# Patient Record
Sex: FEMALE | Race: WHITE | Employment: FULL TIME | URBAN - METROPOLITAN AREA
[De-identification: names, ages, dates, MRNs, and addresses within clinical notes are randomized per-mention and may not be internally consistent; named-entity substitution may affect disease eponyms.]

---

## 2019-02-11 ENCOUNTER — HOSPITAL ENCOUNTER (EMERGENCY)
Age: 19
Discharge: HOME | End: 2019-02-11

## 2019-02-11 VITALS
SYSTOLIC BLOOD PRESSURE: 129 MMHG | BODY MASS INDEX: 33.32 KG/M2 | RESPIRATION RATE: 16 BRPM | TEMPERATURE: 98 F | DIASTOLIC BLOOD PRESSURE: 86 MMHG | OXYGEN SATURATION: 100 % | WEIGHT: 200 LBS | HEIGHT: 65 IN | HEART RATE: 90 BPM

## 2019-02-11 DIAGNOSIS — F41.1 ANXIETY STATE: ICD-10-CM

## 2019-02-11 DIAGNOSIS — J02.9 ACUTE PHARYNGITIS, UNSPECIFIED ETIOLOGY: ICD-10-CM

## 2019-02-11 DIAGNOSIS — R59.0 AXILLARY LYMPHADENOPATHY: Primary | ICD-10-CM

## 2019-02-11 PROCEDURE — 99282 EMERGENCY DEPT VISIT SF MDM: CPT

## 2019-02-11 RX ORDER — HYDROXYZINE PAMOATE 25 MG/1
25 CAPSULE ORAL 3 TIMES DAILY PRN
Qty: 30 CAPSULE | Refills: 0 | Status: SHIPPED | OUTPATIENT
Start: 2019-02-11 | End: 2019-02-25

## 2019-02-11 ASSESSMENT — PAIN SCALES - GENERAL: PAINLEVEL_OUTOF10: 8

## 2019-02-13 ENCOUNTER — HOSPITAL ENCOUNTER (EMERGENCY)
Age: 19
Discharge: HOME | End: 2019-02-13

## 2019-02-13 VITALS
RESPIRATION RATE: 16 BRPM | TEMPERATURE: 97.7 F | HEIGHT: 65 IN | HEART RATE: 75 BPM | SYSTOLIC BLOOD PRESSURE: 106 MMHG | DIASTOLIC BLOOD PRESSURE: 60 MMHG | BODY MASS INDEX: 33.32 KG/M2 | WEIGHT: 200 LBS | OXYGEN SATURATION: 100 %

## 2019-02-13 DIAGNOSIS — L02.419 AXILLARY ABSCESS: Primary | ICD-10-CM

## 2019-02-13 PROCEDURE — 2500000003 HC RX 250 WO HCPCS: Performed by: PHYSICIAN ASSISTANT

## 2019-02-13 PROCEDURE — 90715 TDAP VACCINE 7 YRS/> IM: CPT | Performed by: PHYSICIAN ASSISTANT

## 2019-02-13 PROCEDURE — 4500000027

## 2019-02-13 PROCEDURE — 6370000000 HC RX 637 (ALT 250 FOR IP): Performed by: PHYSICIAN ASSISTANT

## 2019-02-13 PROCEDURE — 6360000002 HC RX W HCPCS: Performed by: PHYSICIAN ASSISTANT

## 2019-02-13 PROCEDURE — 90471 IMMUNIZATION ADMIN: CPT | Performed by: PHYSICIAN ASSISTANT

## 2019-02-13 PROCEDURE — 99282 EMERGENCY DEPT VISIT SF MDM: CPT

## 2019-02-13 RX ORDER — CEPHALEXIN 500 MG/1
500 CAPSULE ORAL 4 TIMES DAILY
Qty: 40 CAPSULE | Refills: 0 | Status: SHIPPED | OUTPATIENT
Start: 2019-02-13 | End: 2019-02-23

## 2019-02-13 RX ORDER — SULFAMETHOXAZOLE AND TRIMETHOPRIM 800; 160 MG/1; MG/1
TABLET ORAL
Qty: 40 TABLET | Refills: 0 | Status: SHIPPED | OUTPATIENT
Start: 2019-02-13 | End: 2019-03-07 | Stop reason: ALTCHOICE

## 2019-02-13 RX ORDER — CEPHALEXIN 250 MG/1
500 CAPSULE ORAL ONCE
Status: COMPLETED | OUTPATIENT
Start: 2019-02-13 | End: 2019-02-13

## 2019-02-13 RX ORDER — SULFAMETHOXAZOLE AND TRIMETHOPRIM 800; 160 MG/1; MG/1
1 TABLET ORAL ONCE
Status: COMPLETED | OUTPATIENT
Start: 2019-02-13 | End: 2019-02-13

## 2019-02-13 RX ORDER — LIDOCAINE HYDROCHLORIDE AND EPINEPHRINE BITARTRATE 20; .01 MG/ML; MG/ML
20 INJECTION, SOLUTION SUBCUTANEOUS ONCE
Status: COMPLETED | OUTPATIENT
Start: 2019-02-13 | End: 2019-02-13

## 2019-02-13 RX ORDER — HYDROCODONE BITARTRATE AND ACETAMINOPHEN 5; 325 MG/1; MG/1
1 TABLET ORAL ONCE
Status: COMPLETED | OUTPATIENT
Start: 2019-02-13 | End: 2019-02-13

## 2019-02-13 RX ADMIN — LIDOCAINE HYDROCHLORIDE,EPINEPHRINE BITARTRATE 20 ML: 20; .01 INJECTION, SOLUTION INFILTRATION; PERINEURAL at 19:02

## 2019-02-13 RX ADMIN — HYDROCODONE BITARTRATE AND ACETAMINOPHEN 1 TABLET: 5; 325 TABLET ORAL at 19:03

## 2019-02-13 RX ADMIN — SULFAMETHOXAZOLE AND TRIMETHOPRIM 1 TABLET: 800; 160 TABLET ORAL at 19:03

## 2019-02-13 RX ADMIN — CEPHALEXIN 500 MG: 250 CAPSULE ORAL at 19:03

## 2019-02-13 RX ADMIN — TETANUS TOXOID, REDUCED DIPHTHERIA TOXOID AND ACELLULAR PERTUSSIS VACCINE, ADSORBED 0.5 ML: 5; 2.5; 8; 8; 2.5 SUSPENSION INTRAMUSCULAR at 19:03

## 2019-02-13 ASSESSMENT — PAIN SCALES - GENERAL
PAINLEVEL_OUTOF10: 9

## 2019-02-13 ASSESSMENT — PAIN DESCRIPTION - DESCRIPTORS: DESCRIPTORS: SHARP;ACHING

## 2019-02-13 ASSESSMENT — PAIN DESCRIPTION - LOCATION: LOCATION: ABDOMEN

## 2019-02-13 ASSESSMENT — PAIN DESCRIPTION - FREQUENCY: FREQUENCY: CONTINUOUS

## 2019-02-13 ASSESSMENT — PAIN DESCRIPTION - PAIN TYPE: TYPE: ACUTE PAIN

## 2019-02-13 ASSESSMENT — PAIN DESCRIPTION - ORIENTATION: ORIENTATION: LEFT

## 2019-03-07 ENCOUNTER — OFFICE VISIT (OUTPATIENT)
Dept: FAMILY MEDICINE CLINIC | Age: 19
End: 2019-03-07

## 2019-03-07 VITALS
BODY MASS INDEX: 34.89 KG/M2 | TEMPERATURE: 98.5 F | OXYGEN SATURATION: 99 % | HEART RATE: 60 BPM | DIASTOLIC BLOOD PRESSURE: 70 MMHG | SYSTOLIC BLOOD PRESSURE: 90 MMHG | HEIGHT: 65 IN | WEIGHT: 209.4 LBS

## 2019-03-07 DIAGNOSIS — N92.6 IRREGULAR MENSES: ICD-10-CM

## 2019-03-07 DIAGNOSIS — F32.A ANXIETY AND DEPRESSION: Primary | ICD-10-CM

## 2019-03-07 DIAGNOSIS — F41.9 ANXIETY AND DEPRESSION: Primary | ICD-10-CM

## 2019-03-07 LAB
CONTROL: NORMAL
PREGNANCY TEST URINE, POC: NEGATIVE

## 2019-03-07 PROCEDURE — 99202 OFFICE O/P NEW SF 15 MIN: CPT | Performed by: NURSE PRACTITIONER

## 2019-03-07 PROCEDURE — G0444 DEPRESSION SCREEN ANNUAL: HCPCS | Performed by: NURSE PRACTITIONER

## 2019-03-07 PROCEDURE — 81025 URINE PREGNANCY TEST: CPT | Performed by: NURSE PRACTITIONER

## 2019-03-07 RX ORDER — FLUOXETINE HYDROCHLORIDE 20 MG/1
20 CAPSULE ORAL DAILY
Qty: 30 CAPSULE | Refills: 2 | Status: SHIPPED | OUTPATIENT
Start: 2019-03-07 | End: 2019-06-20

## 2019-03-07 RX ORDER — CITALOPRAM 20 MG/1
20 TABLET ORAL DAILY
Qty: 30 TABLET | Refills: 2 | Status: SHIPPED | OUTPATIENT
Start: 2019-03-07 | End: 2019-03-07 | Stop reason: SINTOL

## 2019-03-07 RX ORDER — HYDROXYZINE PAMOATE 25 MG/1
25 CAPSULE ORAL 3 TIMES DAILY PRN
COMMUNITY
End: 2019-03-07 | Stop reason: SDUPTHER

## 2019-03-07 RX ORDER — HYDROXYZINE PAMOATE 25 MG/1
25 CAPSULE ORAL 3 TIMES DAILY PRN
Qty: 30 CAPSULE | Refills: 2 | Status: SHIPPED | OUTPATIENT
Start: 2019-03-07 | End: 2019-06-20

## 2019-03-07 RX ORDER — METOPROLOL SUCCINATE 25 MG/1
1 TABLET, EXTENDED RELEASE ORAL DAILY
COMMUNITY
End: 2019-07-23 | Stop reason: SDUPTHER

## 2019-03-07 ASSESSMENT — PATIENT HEALTH QUESTIONNAIRE - PHQ9
10. IF YOU CHECKED OFF ANY PROBLEMS, HOW DIFFICULT HAVE THESE PROBLEMS MADE IT FOR YOU TO DO YOUR WORK, TAKE CARE OF THINGS AT HOME, OR GET ALONG WITH OTHER PEOPLE: 3
9. THOUGHTS THAT YOU WOULD BE BETTER OFF DEAD, OR OF HURTING YOURSELF: 0
4. FEELING TIRED OR HAVING LITTLE ENERGY: 3
3. TROUBLE FALLING OR STAYING ASLEEP: 3
2. FEELING DOWN, DEPRESSED OR HOPELESS: 2
5. POOR APPETITE OR OVEREATING: 3
6. FEELING BAD ABOUT YOURSELF - OR THAT YOU ARE A FAILURE OR HAVE LET YOURSELF OR YOUR FAMILY DOWN: 3
SUM OF ALL RESPONSES TO PHQ9 QUESTIONS 1 & 2: 5
8. MOVING OR SPEAKING SO SLOWLY THAT OTHER PEOPLE COULD HAVE NOTICED. OR THE OPPOSITE, BEING SO FIGETY OR RESTLESS THAT YOU HAVE BEEN MOVING AROUND A LOT MORE THAN USUAL: 3
SUM OF ALL RESPONSES TO PHQ QUESTIONS 1-9: 21
1. LITTLE INTEREST OR PLEASURE IN DOING THINGS: 3
SUM OF ALL RESPONSES TO PHQ QUESTIONS 1-9: 21
7. TROUBLE CONCENTRATING ON THINGS, SUCH AS READING THE NEWSPAPER OR WATCHING TELEVISION: 1

## 2019-03-07 ASSESSMENT — ENCOUNTER SYMPTOMS
SHORTNESS OF BREATH: 0
NAUSEA: 0
COUGH: 0
ABDOMINAL PAIN: 0

## 2019-06-20 ENCOUNTER — HOSPITAL ENCOUNTER (EMERGENCY)
Age: 19
Discharge: HOME | End: 2019-06-20
Attending: EMERGENCY MEDICINE

## 2019-06-20 VITALS
RESPIRATION RATE: 15 BRPM | HEART RATE: 75 BPM | HEIGHT: 65 IN | BODY MASS INDEX: 31.65 KG/M2 | DIASTOLIC BLOOD PRESSURE: 68 MMHG | SYSTOLIC BLOOD PRESSURE: 120 MMHG | TEMPERATURE: 98 F | WEIGHT: 190 LBS | OXYGEN SATURATION: 99 %

## 2019-06-20 DIAGNOSIS — R45.851 SUICIDAL THOUGHTS: ICD-10-CM

## 2019-06-20 DIAGNOSIS — F41.9 ANXIETY: Primary | ICD-10-CM

## 2019-06-20 DIAGNOSIS — F32.A DEPRESSION, UNSPECIFIED DEPRESSION TYPE: ICD-10-CM

## 2019-06-20 LAB
ACETAMINOPHEN LEVEL: <5 UG/ML (ref 15–30)
ALBUMIN SERPL-MCNC: 4.6 GM/DL (ref 3.4–5)
ALCOHOL SCREEN SERUM: NORMAL %WT/VOL
ALP BLD-CCNC: 59 IU/L (ref 40–128)
ALT SERPL-CCNC: 8 U/L (ref 10–40)
AMPHETAMINES: NEGATIVE
ANION GAP SERPL CALCULATED.3IONS-SCNC: 15 MMOL/L (ref 4–16)
AST SERPL-CCNC: 11 IU/L (ref 15–37)
BARBITURATE SCREEN URINE: NEGATIVE
BASOPHILS ABSOLUTE: 0 K/CU MM
BASOPHILS RELATIVE PERCENT: 0.2 % (ref 0–1)
BENZODIAZEPINE SCREEN, URINE: NEGATIVE
BILIRUB SERPL-MCNC: 0.9 MG/DL (ref 0–1)
BUN BLDV-MCNC: 12 MG/DL (ref 6–23)
CALCIUM SERPL-MCNC: 9.5 MG/DL (ref 8.3–10.6)
CANNABINOID SCREEN URINE: ABNORMAL
CHLORIDE BLD-SCNC: 102 MMOL/L (ref 99–110)
CO2: 21 MMOL/L (ref 21–32)
COCAINE METABOLITE: NEGATIVE
CREAT SERPL-MCNC: 0.7 MG/DL (ref 0.6–1.1)
DIFFERENTIAL TYPE: ABNORMAL
DOSE AMOUNT: ABNORMAL
DOSE AMOUNT: ABNORMAL
DOSE TIME: ABNORMAL
DOSE TIME: ABNORMAL
EOSINOPHILS ABSOLUTE: 0 K/CU MM
EOSINOPHILS RELATIVE PERCENT: 0.2 % (ref 0–3)
GFR AFRICAN AMERICAN: >60 ML/MIN/1.73M2
GFR NON-AFRICAN AMERICAN: >60 ML/MIN/1.73M2
GLUCOSE BLD-MCNC: 107 MG/DL (ref 70–99)
HCG QUALITATIVE: NEGATIVE
HCT VFR BLD CALC: 40.7 % (ref 37–47)
HEMOGLOBIN: 13.2 GM/DL (ref 12.5–16)
IMMATURE NEUTROPHIL %: 0.4 % (ref 0–0.43)
LYMPHOCYTES ABSOLUTE: 1.5 K/CU MM
LYMPHOCYTES RELATIVE PERCENT: 14.3 % (ref 24–44)
MCH RBC QN AUTO: 32.1 PG (ref 27–31)
MCHC RBC AUTO-ENTMCNC: 32.4 % (ref 32–36)
MCV RBC AUTO: 99 FL (ref 78–100)
MONOCYTES ABSOLUTE: 0.6 K/CU MM
MONOCYTES RELATIVE PERCENT: 6 % (ref 0–4)
NUCLEATED RBC %: 0 %
OPIATES, URINE: NEGATIVE
OXYCODONE: ABNORMAL
PDW BLD-RTO: 12.3 % (ref 11.7–14.9)
PHENCYCLIDINE, URINE: NEGATIVE
PLATELET # BLD: 346 K/CU MM (ref 140–440)
PMV BLD AUTO: 9.2 FL (ref 7.5–11.1)
POTASSIUM SERPL-SCNC: 4 MMOL/L (ref 3.5–5.1)
RBC # BLD: 4.11 M/CU MM (ref 4.2–5.4)
SALICYLATE LEVEL: <0.3 MG/DL (ref 15–30)
SEGMENTED NEUTROPHILS ABSOLUTE COUNT: 8.2 K/CU MM
SEGMENTED NEUTROPHILS RELATIVE PERCENT: 78.9 % (ref 36–66)
SODIUM BLD-SCNC: 138 MMOL/L (ref 135–145)
TOTAL IMMATURE NEUTOROPHIL: 0.04 K/CU MM
TOTAL NUCLEATED RBC: 0 K/CU MM
TOTAL PROTEIN: 7.3 GM/DL (ref 6.4–8.2)
TSH HIGH SENSITIVITY: 1.81 UIU/ML (ref 0.27–4.2)
WBC # BLD: 10.4 K/CU MM (ref 4–10.5)

## 2019-06-20 PROCEDURE — G0480 DRUG TEST DEF 1-7 CLASSES: HCPCS

## 2019-06-20 PROCEDURE — 85025 COMPLETE CBC W/AUTO DIFF WBC: CPT

## 2019-06-20 PROCEDURE — 6370000000 HC RX 637 (ALT 250 FOR IP): Performed by: PHYSICIAN ASSISTANT

## 2019-06-20 PROCEDURE — 84703 CHORIONIC GONADOTROPIN ASSAY: CPT

## 2019-06-20 PROCEDURE — 80307 DRUG TEST PRSMV CHEM ANLYZR: CPT

## 2019-06-20 PROCEDURE — 99283 EMERGENCY DEPT VISIT LOW MDM: CPT

## 2019-06-20 PROCEDURE — 36415 COLL VENOUS BLD VENIPUNCTURE: CPT

## 2019-06-20 PROCEDURE — 80053 COMPREHEN METABOLIC PANEL: CPT

## 2019-06-20 PROCEDURE — 84443 ASSAY THYROID STIM HORMONE: CPT

## 2019-06-20 RX ORDER — HYDROXYZINE PAMOATE 25 MG/1
25 CAPSULE ORAL ONCE
Status: COMPLETED | OUTPATIENT
Start: 2019-06-20 | End: 2019-06-20

## 2019-06-20 RX ORDER — FLUOXETINE HYDROCHLORIDE 20 MG/1
20 CAPSULE ORAL DAILY
Qty: 30 CAPSULE | Refills: 0 | Status: SHIPPED | OUTPATIENT
Start: 2019-06-20 | End: 2019-07-23 | Stop reason: SDUPTHER

## 2019-06-20 RX ORDER — HYDROXYZINE HYDROCHLORIDE 25 MG/1
25 TABLET, FILM COATED ORAL 3 TIMES DAILY PRN
Qty: 60 TABLET | Refills: 0 | Status: SHIPPED | OUTPATIENT
Start: 2019-06-20 | End: 2019-07-20

## 2019-06-20 RX ADMIN — HYDROXYZINE PAMOATE 25 MG: 25 CAPSULE ORAL at 17:52

## 2019-06-20 NOTE — ED NOTES
Pt escorted to restroom to change into green gown by [employer]. Personal belongings gathered and inventory sheet completed. Personal belongings locked in cabinet.       Tiffanie Haley RN  06/20/19 7100

## 2019-06-20 NOTE — ED PROVIDER NOTES
eMERGENCY dEPARTMENT eNCOUnter      PCP: No primary care provider on file. CHIEF COMPLAINT    Chief Complaint   Patient presents with    Anxiety    Medication Refill     ran out of her Prozac and Hydroxazine for 3 days    Suicidal       HPI    Alex Oliver is a 19 y.o. female who presents patient presents as above. Having worsening anxiety and suicidal thoughts. Denies making a plan. Patient presents with her mother at bedside. Patient has been on Prozac and hydroxyzine for some time. Recently ran out of medication 3 days ago and feels like this is when her symptoms have started to worsen. She has outpatient follow-up for a walk-in clinic but does not have a regular psychiatrist or therapist that she follows with. She is on a waiting list for this. Denies any audio or visual hallucinations. No homicidal thoughts. Denies any recent illnesses no cough or cold symptoms. No lightheadedness or dizziness. No numbness or tingling. REVIEW OF SYSTEMS    Psychiatric: See HPI. No Auditory/visual hallucinations or homicidal Ideations + suicidal hallucinations  General: No fevers or chills  Cardiac:  No chest pain or palpitations  Respiratory: No difficulty breathing or new cough  Neurologic: No Headache or syncope  GI: No vomiting or diarrhea  : No dysuria or hematuria  See HPI for further details. All other systems reviewed and are negative. PAST MEDICAL & SURGICALHISTORY    Past Medical History:   Diagnosis Date    Anxiety     anxiety induced seizures    Depression     Pseudoseizure      History reviewed. No pertinent surgical history.     CURRENT MEDICATIONS    Current Outpatient Rx   Medication Sig Dispense Refill    hydrOXYzine (ATARAX) 25 MG tablet Take 1 tablet by mouth 3 times daily as needed for Itching or Anxiety 60 tablet 0    FLUoxetine (PROZAC) 20 MG capsule Take 1 capsule by mouth daily 30 capsule 0    metoprolol succinate (TOPROL XL) 25 MG extended release tablet Take 1 tablet murmurs  GI:  Soft, nondistended, normal bowel sounds, nontender  Musculoskeletal:  No edema, no acute deformities   Integument:  Well hydrated   Neurologic:  Awake alert and oriented, no slurred speech, normal gross motor coordination and strength   Psychiatric: speaks Clearly, is pleasant. Admits to suicidal thoughts, denies a plan. Denies any hallucinations or homicidal thoughts. LABS:  Results for orders placed or performed during the hospital encounter of 06/20/19   TSH without Reflex   Result Value Ref Range    TSH, High Sensitivity 1.810 0.270 - 4.20 uIu/ml   Ethanol   Result Value Ref Range    Alcohol Scrn <0.01  THE VALUE IS BELOW OUR DETECTION LIMIT. <0.01 %WT/VOL   Acetaminophen Level   Result Value Ref Range    Acetaminophen Level <5.0 (L) 15 - 30 ug/ml    DOSE AMOUNT       DOSE AMT. GIVEN - Unknown  DOSE AMT.  GIVEN -  patient denies ingestion      DOSE TIME       DOSE TIME GIVEN - Unknown  DOSE TIME GIVEN - patient denies ingestion     CBC Auto Differential   Result Value Ref Range    WBC 10.4 4.0 - 10.5 K/CU MM    RBC 4.11 (L) 4.2 - 5.4 M/CU MM    Hemoglobin 13.2 12.5 - 16.0 GM/DL    Hematocrit 40.7 37 - 47 %    MCV 99.0 78 - 100 FL    MCH 32.1 (H) 27 - 31 PG    MCHC 32.4 32.0 - 36.0 %    RDW 12.3 11.7 - 14.9 %    Platelets 189 879 - 523 K/CU MM    MPV 9.2 7.5 - 11.1 FL    Differential Type AUTOMATED DIFFERENTIAL     Segs Relative 78.9 (H) 36 - 66 %    Lymphocytes % 14.3 (L) 24 - 44 %    Monocytes % 6.0 (H) 0 - 4 %    Eosinophils % 0.2 0 - 3 %    Basophils % 0.2 0 - 1 %    Segs Absolute 8.2 K/CU MM    Lymphocytes # 1.5 K/CU MM    Monocytes # 0.6 K/CU MM    Eosinophils # 0.0 K/CU MM    Basophils # 0.0 K/CU MM    Nucleated RBC % 0.0 %    Total Nucleated RBC 0.0 K/CU MM    Total Immature Neutrophil 0.04 K/CU MM    Immature Neutrophil % 0.4 0 - 0.43 %   Comprehensive Metabolic Panel   Result Value Ref Range    Sodium 138 135 - 145 MMOL/L    Potassium 4.0 3.5 - 5.1 MMOL/L    Chloride 102 99 - 110 mMol/L    CO2 21 21 - 32 MMOL/L    BUN 12 6 - 23 MG/DL    CREATININE 0.7 0.6 - 1.1 MG/DL    Glucose 107 (H) 70 - 99 MG/DL    Calcium 9.5 8.3 - 10.6 MG/DL    Alb 4.6 3.4 - 5.0 GM/DL    Total Protein 7.3 6.4 - 8.2 GM/DL    Total Bilirubin 0.9 0.0 - 1.0 MG/DL    ALT 8 (L) 10 - 40 U/L    AST 11 (L) 15 - 37 IU/L    Alkaline Phosphatase 59 40 - 128 IU/L    GFR Non-African American >60 >60 mL/min/1.73m2    GFR African American >60 >60 mL/min/1.73m2    Anion Gap 15 4 - 16   Salicylate   Result Value Ref Range    Salicylate Lvl <2.9 (L) 15 - 30 MG/DL    DOSE AMOUNT       DOSE AMT. GIVEN - Unknown  DOSE AMT. GIVEN -  patient denies ingestion      DOSE TIME       DOSE TIME GIVEN - Unknown  DOSE TIME GIVEN - patient denies ingestion     Urine Drug Screen   Result Value Ref Range    Cannabinoid Scrn, Ur UNCONFIRMED POSITIVE (A) NEGATIVE    Amphetamines NEGATIVE NEGATIVE    Cocaine Metabolite NEGATIVE NEGATIVE    Benzodiazepine Screen, Urine NEGATIVE NEGATIVE    Barbiturate Screen, Ur NEGATIVE NEGATIVE    Opiates, Urine NEGATIVE NEGATIVE    Phencyclidine, Urine NEGATIVE NEGATIVE    Oxycodone  NEGATIVE     NEGATIVE          THRESHOLD CONCENTRATIONS (mg/dL)  AMPHT               1000  AGNIESZKA,OPIA             300  BZO,BAR              200  PCP                   25  THC                   50  OXY                  100          IF POSITIVE, SPECIMEN WILL BE  DISCARDED AFTER 6 MONTHS. CALL LAB IF CONFIRMATION NEEDED. ALL NEGATIVE SPECIMENS WILL BE  DISCARDED AFTER ONE WEEK. * UNCONFIRMED POSITIVES MAY  NOT MEET FORENSIC REQUIREMENTS. HCG Serum, Qualitative   Result Value Ref Range    hCG Qual NEGATIVE              ED COURSE & MEDICAL DECISION MAKING    Vitals:    06/20/19 1553 06/20/19 2049   BP: 122/72 120/68   Pulse: 76 75   Resp: 16 15   Temp: 98.1 °F (36.7 °C) 98 °F (36.7 °C)   TempSrc: Oral Oral   SpO2: 99%    Weight: 190 lb (86.2 kg)    Height: 5' 5\" (1.651 m)        Presents as above. Is afebrile 99% on room air.   Not tachycardic. Lab work-up ordered for medical clearance. There are no medical problems identified which require immediate intervention or which would preclude psychiatric evaluation    Patient does not have significant adequate follow-up. Only has walking clinic. Has been on a waiting list to get some regular follow-up. She would like to at least speak with mental health to see if they have any further resources to provide her. Vital signs and nursing notes reviewed during ED course. Patient care and presentation staffed with supervising MD.  Dr. Monika Zarate. Patient seen by supervising MD today- see his/her note for details of the encounter. Consultation: Mental health Professional consulted in the emergency Department. See [facility] note for details of MH evaluation. Patient recheck reveals patient lying comfortable in exam bed. Patient denies suicidal thoughts or self-harm thoughts at this time. Patient understands the plan of  Patient understands he/she may return to ED at any time thoughts recur or any other general concerns. I discussed discharge & plan with family member today who is confident that patient will have supervised care and is not concerned about self-harm/suicidal behavior at this time. The family member understands to return to emergency department at any time should symptoms recur, any new symptoms arise, or any general concerns. Differential diagnoses: Drug or alcohol use or abuse, psychosis, behavioral mental illness, metabolic disturbance, infection, neurologic emergency, other    Clinical  IMPRESSION    1. Anxiety    2. Depression, unspecified depression type    3. Suicidal thoughts               Diagnosis and plan discussed in detail with patient who understands and agrees.   Return to emergency Department precautions were discussed in detail with patient, including worsening or any new symptoms, who understands and agre    Comment: Please note this report has been produced using speech recognition software and may contain errors related to that system including errors in grammar, punctuation, and spelling, as well as words and phrases that may be inappropriate. If there are any questions or concerns please feel free to contact the dictating provider for clarification.         Simeon Rodriguez PA-C  06/28/19 1772

## 2019-06-20 NOTE — ED NOTES
Bed: ED-16  Expected date:   Expected time:   Means of arrival:   Comments:  Isma Linda RN  06/20/19 8492

## 2019-06-20 NOTE — ED NOTES
Pt sitting up in bed eating meal watching TV.  Sitter per policy 1:1     Nicolas Hopson RN  06/20/19 5196

## 2019-06-20 NOTE — ED NOTES
Pt medicated per mar for anxiety. Pt states had suicidal thought earlier, had no plan in place. Pt denies any suicidal thoughts at this time.  Sitter per policy 1:1     Ninfa Bernheim, RN  06/20/19 6091

## 2019-06-20 NOTE — ED PROVIDER NOTES
eMERGENCY dEPARTMENT eNCOUnter    Attending note    I cared for and evaluated the patient in conjunction with the ED Advanced Practice Provider    HPI/Physical Exam/Medical Decision Making  Tommy Palmer is a 19 y.o. female here for evaluation of anxiety and depression. She has been having worsening of symptoms over the past three days. She ran out of her Hydroxyzine and Prozac three days ago. She admits to having intermittent suicidal thoughts, but denies yue suicidal at this time. She denies a specific plan to hurt herself. Denies homicidal ideation or hallucinations. She states that she really just needs her medications refilled. Please see JASMIN note for further details. Vitals:   ED Triage Vitals [06/20/19 1553]   Enc Vitals Group      /72      Pulse 76      Resp 16      Temp 98.1 °F (36.7 °C)      Temp Source Oral      SpO2 99 %      Weight 190 lb (86.2 kg)      Height 5' 5\" (1.651 m)      Head Circumference       Peak Flow       Pain Score       Pain Loc       Pain Edu? Excl. in [address]? On exam, the patient is afebrile and nontoxic appearing. She is hemodynamically stable and neurologically intact. Airway is patent. Neck is supple. Heart sounds have a regular rate and rhythm. Lungs are clear to auscultation bilaterally. The patient's abdomen is soft, non-tender and non-distended with no peritoneal signs. Extremities are warm, pink, and well perfused. Labs are obtained and urine drug screen is positive for cannabinoids. There are no other clinically symptomatic and lab abnormalities. The patient was seen and evaluated by the mental health nurse, Trent Alexander, who feels that the patient is stable for outpatient management. The patient denies being currently suicidal. We will provide her with refills of her Prozac and hydroxyzine. She will go to the walk-in clinic where she is seen tomorrow. She is given strict return precautions.  The patient's mother is here at the bedside and is comfortable taking the patient home. The patient verbalized understanding, was agreeable with plan, and was discharged home in stable condition. Stable for outpatient management with close follow up. Return to the ED for worsening symptoms. All diagnostic, treatment, and disposition decisions were made by myself in conjunction with the advanced practice provider. For all further details of the patient's emergency department visit, please see the advanced practice provider's documentation. Comment: Please note this report has been produced using speech recognition software and may contain errors related to that system including errors in grammar, punctuation, and spelling, as well as words and phrases that may be inappropriate. If there are any questions or concerns please feel free to contact the dictating provider for clarification.         Fabiola Roberts MD  06/21/19 0980

## 2019-06-20 NOTE — ED NOTES
Pt ambulated to bathroom with sitter. Pt calm & cooperative at this time.  Sitter per policy 1:1     Anh De Dios RN  06/20/19 3564
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
## 2019-06-21 NOTE — ED NOTES
Discharge instructions reviewed. All questions answered to pt satisfaction. Pt alert, oriented, and ambulatory upon discharge.       Manual Ninoska, GAB  06/20/19 2050

## 2019-07-23 ENCOUNTER — OFFICE VISIT (OUTPATIENT)
Dept: FAMILY MEDICINE CLINIC | Age: 19
End: 2019-07-23

## 2019-07-23 VITALS
OXYGEN SATURATION: 97 % | BODY MASS INDEX: 36.11 KG/M2 | WEIGHT: 217 LBS | HEART RATE: 65 BPM | SYSTOLIC BLOOD PRESSURE: 94 MMHG | DIASTOLIC BLOOD PRESSURE: 66 MMHG | TEMPERATURE: 98.5 F

## 2019-07-23 DIAGNOSIS — F41.9 ANXIETY AND DEPRESSION: Primary | ICD-10-CM

## 2019-07-23 DIAGNOSIS — F32.A ANXIETY AND DEPRESSION: Primary | ICD-10-CM

## 2019-07-23 DIAGNOSIS — R00.0 TACHYCARDIA: ICD-10-CM

## 2019-07-23 PROCEDURE — 99213 OFFICE O/P EST LOW 20 MIN: CPT | Performed by: NURSE PRACTITIONER

## 2019-07-23 RX ORDER — METOPROLOL SUCCINATE 25 MG/1
25 TABLET, EXTENDED RELEASE ORAL DAILY
Qty: 30 TABLET | Refills: 1 | Status: SHIPPED | OUTPATIENT
Start: 2019-07-23 | End: 2020-01-07 | Stop reason: SDUPTHER

## 2019-07-23 RX ORDER — HYDROXYZINE HYDROCHLORIDE 25 MG/1
25 TABLET, FILM COATED ORAL 3 TIMES DAILY PRN
Qty: 90 TABLET | Refills: 1 | Status: SHIPPED | OUTPATIENT
Start: 2019-07-23

## 2019-07-23 RX ORDER — HYDROXYZINE HYDROCHLORIDE 25 MG/1
TABLET, FILM COATED ORAL
COMMUNITY
End: 2019-07-23 | Stop reason: SDUPTHER

## 2019-07-23 RX ORDER — FLUOXETINE HYDROCHLORIDE 20 MG/1
20 CAPSULE ORAL DAILY
Qty: 30 CAPSULE | Refills: 1 | Status: SHIPPED | OUTPATIENT
Start: 2019-07-23 | End: 2020-01-07

## 2019-07-23 NOTE — PROGRESS NOTES
36.11 kg/m²   BP Readings from Last 3 Encounters:   07/23/19 94/66   06/20/19 120/68   03/07/19 90/70     Wt Readings from Last 3 Encounters:   07/23/19 217 lb (98.4 kg)   06/20/19 190 lb (86.2 kg)   03/07/19 209 lb 6.4 oz (95 kg)         Physical Exam   Constitutional: She is oriented to person, place, and time. She appears well-developed and well-nourished. HENT:   Head: Normocephalic. Neck: Neck supple. Cardiovascular: Normal rate, regular rhythm and normal heart sounds. Pulmonary/Chest: Effort normal and breath sounds normal.   Neurological: She is alert and oriented to person, place, and time. Skin: Skin is warm and dry. Psychiatric: She has a normal mood and affect. Her behavior is normal.       Lab Results   Component Value Date    WBC 10.4 06/20/2019    HGB 13.2 06/20/2019    HCT 40.7 06/20/2019    MCV 99.0 06/20/2019     06/20/2019     Lab Results   Component Value Date     06/20/2019    K 4.0 06/20/2019     06/20/2019    CO2 21 06/20/2019    BUN 12 06/20/2019    CREATININE 0.7 06/20/2019    GLUCOSE 107 (H) 06/20/2019    CALCIUM 9.5 06/20/2019    PROT 7.3 06/20/2019    LABALBU 4.6 06/20/2019    BILITOT 0.9 06/20/2019    ALKPHOS 59 06/20/2019    AST 11 (L) 06/20/2019    ALT 8 (L) 06/20/2019    LABGLOM >60 06/20/2019    GFRAA >60 06/20/2019     No results found for: CHOL  No results found for: TRIG  No results found for: HDL  No results found for: LDLCALC, LDLCHOLESTEROL  No results found for: LABA1C  Lab Results   Component Value Date    [facility] 1.810 06/20/2019         ASSESSMENT/PLAN:      1. Anxiety and depression  Stable. Refilled medications.   - FLUoxetine (PROZAC) 20 MG capsule; Take 1 capsule by mouth daily  Dispense: 30 capsule; Refill: 1  - hydrOXYzine (ATARAX) 25 MG tablet; Take 1 tablet by mouth 3 times daily as needed for Anxiety  Dispense: 90 tablet; Refill: 1    2. Tachycardia  Stable.  Refilled medication.   - metoprolol succinate (TOPROL XL) 25 MG extended release

## 2019-07-24 ASSESSMENT — ENCOUNTER SYMPTOMS
VOMITING: 0
DIARRHEA: 0
RESPIRATORY NEGATIVE: 1
NAUSEA: 0

## 2019-10-17 ENCOUNTER — HOSPITAL ENCOUNTER (EMERGENCY)
Age: 19
Discharge: HOME | End: 2019-10-17
Payer: MEDICAID

## 2019-10-17 VITALS
RESPIRATION RATE: 16 BRPM | OXYGEN SATURATION: 98 % | WEIGHT: 200 LBS | HEIGHT: 65 IN | HEART RATE: 90 BPM | BODY MASS INDEX: 33.32 KG/M2 | SYSTOLIC BLOOD PRESSURE: 127 MMHG | TEMPERATURE: 98.1 F | DIASTOLIC BLOOD PRESSURE: 90 MMHG

## 2019-10-17 DIAGNOSIS — L02.91 ABSCESS: Primary | ICD-10-CM

## 2019-10-17 PROCEDURE — 99283 EMERGENCY DEPT VISIT LOW MDM: CPT

## 2019-10-17 PROCEDURE — 4500000028 HC INTERMEDIATE PROCEDURE

## 2019-10-17 PROCEDURE — 2500000003 HC RX 250 WO HCPCS: Performed by: PHYSICIAN ASSISTANT

## 2019-10-17 RX ORDER — LIDOCAINE HYDROCHLORIDE 20 MG/ML
5 INJECTION, SOLUTION EPIDURAL; INFILTRATION; INTRACAUDAL; PERINEURAL ONCE
Status: COMPLETED | OUTPATIENT
Start: 2019-10-17 | End: 2019-10-17

## 2019-10-17 RX ORDER — SULFAMETHOXAZOLE AND TRIMETHOPRIM 800; 160 MG/1; MG/1
1 TABLET ORAL 2 TIMES DAILY
Qty: 20 TABLET | Refills: 0 | Status: SHIPPED | OUTPATIENT
Start: 2019-10-17 | End: 2019-10-27

## 2019-10-17 RX ADMIN — LIDOCAINE HYDROCHLORIDE 5 ML: 20 INJECTION, SOLUTION EPIDURAL; INFILTRATION; INTRACAUDAL; PERINEURAL at 12:15

## 2019-10-17 ASSESSMENT — PAIN DESCRIPTION - PAIN TYPE: TYPE: ACUTE PAIN

## 2019-10-17 ASSESSMENT — PAIN DESCRIPTION - LOCATION: LOCATION: OTHER (COMMENT)

## 2019-10-17 ASSESSMENT — PAIN SCALES - GENERAL: PAINLEVEL_OUTOF10: 8

## 2020-01-07 ENCOUNTER — OFFICE VISIT (OUTPATIENT)
Dept: FAMILY MEDICINE CLINIC | Age: 20
End: 2020-01-07

## 2020-01-07 VITALS
OXYGEN SATURATION: 100 % | DIASTOLIC BLOOD PRESSURE: 62 MMHG | HEART RATE: 71 BPM | WEIGHT: 225 LBS | BODY MASS INDEX: 37.44 KG/M2 | SYSTOLIC BLOOD PRESSURE: 104 MMHG | TEMPERATURE: 98.1 F

## 2020-01-07 PROCEDURE — 99213 OFFICE O/P EST LOW 20 MIN: CPT | Performed by: NURSE PRACTITIONER

## 2020-01-07 RX ORDER — FLUOXETINE 10 MG/1
CAPSULE ORAL
COMMUNITY
Start: 2019-12-12

## 2020-01-07 RX ORDER — METOPROLOL SUCCINATE 25 MG/1
25 TABLET, EXTENDED RELEASE ORAL DAILY
Qty: 30 TABLET | Refills: 0 | Status: SHIPPED | OUTPATIENT
Start: 2020-01-07

## 2020-01-07 ASSESSMENT — ENCOUNTER SYMPTOMS
NAUSEA: 0
RESPIRATORY NEGATIVE: 1
DIARRHEA: 0
VOMITING: 0

## 2020-01-07 NOTE — PROGRESS NOTES
James Ser   22 y.o.  female  [id number]      Chief Complaint   Patient presents with    Medication Refill        Subjective:  22 y. o.female is here for a follow up. She has the following chronic/acute medical problems: There is no problem list on file for this patient. Patient is here for a medication refill - metoprolol. She states she does well on this medication. Patient needs a PCP. Review of Systems   Constitutional: Negative for appetite change, chills, fatigue and fever. HENT: Negative. Respiratory: Negative. Cardiovascular: Negative for chest pain and palpitations. Gastrointestinal: Negative for diarrhea, nausea and vomiting. Skin: Negative for rash. Neurological: Negative for dizziness, light-headedness and headaches. Current Outpatient Medications   Medication Sig Dispense Refill    FLUoxetine (PROZAC) 10 MG capsule TAKE 3 CAPSULES BY MOUTH ONCE DAILY      metoprolol succinate (TOPROL XL) 25 MG extended release tablet Take 1 tablet by mouth daily 30 tablet 0    hydrOXYzine (ATARAX) 25 MG tablet Take 1 tablet by mouth 3 times daily as needed for Anxiety 90 tablet 1    etonogestrel (NEXPLANON) 68 MG implant 68 mg by Subdermal route once       No current facility-administered medications for this visit. Past medical, family,surgical history reviewed today. Objective:  /62   Pulse 71   Temp 98.1 °F (36.7 °C) (Oral)   Wt 225 lb (102.1 kg)   SpO2 100%   Breastfeeding? No   BMI 37.44 kg/m²   BP Readings from Last 3 Encounters:   01/07/20 104/62   10/17/19 (!) 127/90   07/23/19 94/66     Wt Readings from Last 3 Encounters:   01/07/20 225 lb (102.1 kg)   10/17/19 200 lb (90.7 kg)   07/23/19 217 lb (98.4 kg)         Physical Exam  Constitutional:       Appearance: Normal appearance. HENT:      Head: Normocephalic. Neck:      Musculoskeletal: Neck supple. Cardiovascular:      Rate and Rhythm: Normal rate and regular rhythm.       Heart sounds: Normal

## 2021-12-26 ENCOUNTER — HOSPITAL ENCOUNTER (EMERGENCY)
Age: 21
Discharge: LEFT BEFORE DISCHARGE | End: 2021-12-26

## 2021-12-26 VITALS
BODY MASS INDEX: 36.65 KG/M2 | TEMPERATURE: 99.2 F | WEIGHT: 220 LBS | OXYGEN SATURATION: 100 % | HEIGHT: 65 IN | SYSTOLIC BLOOD PRESSURE: 123 MMHG | RESPIRATION RATE: 17 BRPM | DIASTOLIC BLOOD PRESSURE: 79 MMHG | HEART RATE: 59 BPM

## 2021-12-26 ASSESSMENT — PAIN DESCRIPTION - LOCATION: LOCATION: HEAD;THROAT

## 2021-12-26 ASSESSMENT — PAIN DESCRIPTION - DESCRIPTORS: DESCRIPTORS: TIGHTNESS;SHARP

## 2021-12-26 ASSESSMENT — PAIN SCALES - GENERAL: PAINLEVEL_OUTOF10: 7

## 2021-12-26 ASSESSMENT — PAIN DESCRIPTION - FREQUENCY: FREQUENCY: INTERMITTENT

## 2022-04-03 ENCOUNTER — HOSPITAL ENCOUNTER (EMERGENCY)
Age: 22
Discharge: HOME | End: 2022-04-03
Payer: COMMERCIAL

## 2022-04-03 VITALS
OXYGEN SATURATION: 99 % | SYSTOLIC BLOOD PRESSURE: 128 MMHG | DIASTOLIC BLOOD PRESSURE: 87 MMHG | BODY MASS INDEX: 36.65 KG/M2 | WEIGHT: 220 LBS | HEART RATE: 103 BPM | TEMPERATURE: 98.1 F | RESPIRATION RATE: 19 BRPM | HEIGHT: 65 IN

## 2022-04-03 DIAGNOSIS — J06.9 UPPER RESPIRATORY TRACT INFECTION, UNSPECIFIED TYPE: Primary | ICD-10-CM

## 2022-04-03 PROCEDURE — 6370000000 HC RX 637 (ALT 250 FOR IP): Performed by: PHYSICIAN ASSISTANT

## 2022-04-03 PROCEDURE — 99283 EMERGENCY DEPT VISIT LOW MDM: CPT

## 2022-04-03 RX ORDER — AZITHROMYCIN 250 MG/1
500 TABLET, FILM COATED ORAL ONCE
Status: COMPLETED | OUTPATIENT
Start: 2022-04-03 | End: 2022-04-03

## 2022-04-03 RX ORDER — AZITHROMYCIN 250 MG/1
TABLET, FILM COATED ORAL
Qty: 1 PACKET | Refills: 0 | Status: SHIPPED | OUTPATIENT
Start: 2022-04-03 | End: 2022-04-07

## 2022-04-03 RX ORDER — ALBUTEROL SULFATE 90 UG/1
2 AEROSOL, METERED RESPIRATORY (INHALATION) EVERY 4 HOURS PRN
Qty: 18 G | Refills: 1 | Status: SHIPPED | OUTPATIENT
Start: 2022-04-03 | End: 2022-05-03

## 2022-04-03 RX ORDER — DEXTROMETHORPHAN HYDROBROMIDE, GUAIFENESIN AND PSEUDOEPHEDRINE HYDROCHLORIDE 15; 400; 60 MG/1; MG/1; MG/1
1 TABLET ORAL 3 TIMES DAILY PRN
Qty: 20 TABLET | Refills: 0 | Status: SHIPPED | OUTPATIENT
Start: 2022-04-03

## 2022-04-03 RX ADMIN — BENZOCAINE AND MENTHOL 1 LOZENGE: 15; 3.6 LOZENGE ORAL at 02:05

## 2022-04-03 RX ADMIN — AZITHROMYCIN 500 MG: 250 TABLET, FILM COATED ORAL at 02:04

## 2022-04-03 ASSESSMENT — PAIN DESCRIPTION - PAIN TYPE: TYPE: ACUTE PAIN

## 2022-04-03 ASSESSMENT — PAIN DESCRIPTION - LOCATION: LOCATION: FACE;HEAD

## 2022-04-03 ASSESSMENT — PAIN SCALES - GENERAL: PAINLEVEL_OUTOF10: 8

## 2022-04-03 ASSESSMENT — PAIN - FUNCTIONAL ASSESSMENT: PAIN_FUNCTIONAL_ASSESSMENT: 0-10

## 2022-04-03 NOTE — ED PROVIDER NOTES
Triage Chief Complaint:   Cough and Pharyngitis    TREV Pereira is a 19 y.o. female that presents cough, congestion, facial fullness, sore throat x2 days. No fever chills nausea from diarrhea. Eating drinking limiting baseline no abdominal pain chest pain back pain head pain headache. ROS:  At least  06 systems reviewed and otherwise negative except as in the [address] Past Medical History:   Diagnosis Date    Anxiety     anxiety induced seizures    Depression     Pseudoseizure      History reviewed. No pertinent surgical history. History reviewed. No pertinent family history. Social History     Socioeconomic History    Marital status: Single     Spouse name: Not on file    Number of children: Not on file    Years of education: Not on file    Highest education level: Not on file   Occupational History    Not on file   Tobacco Use    Smoking status: Current Every Day Smoker     Packs/day: 0.50     Types: Cigarettes     Start date: 1/7/2016    Smokeless tobacco: Never Used   Vaping Use    Vaping Use: Never used   Substance and Sexual Activity    Alcohol use: No    Drug use: Yes     Types: Marijuana Rodena Capes)    Sexual activity: Not on file   Other Topics Concern    Not on file   Social History Narrative    Not on file     Social Determinants of Health     Financial Resource Strain:     Difficulty of Paying Living Expenses: Not on file   Food Insecurity:     Worried About Running Out of Food in the Last Year: Not on file    Carson of Food in the Last Year: Not on file   Transportation Needs:     Lack of Transportation (Medical): Not on file    Lack of Transportation (Non-Medical):  Not on file   Physical Activity:     Days of Exercise per Week: Not on file    Minutes of Exercise per Session: Not on file   Stress:     Feeling of Stress : Not on file   Social Connections:     Frequency of Communication with Friends and Family: Not on file    Frequency of Social Gatherings with Friends and Family: Not on file    Attends Sikh Services: Not on file    Active Member of Clubs or Organizations: Not on file    Attends Club or Organization Meetings: Not on file    Marital Status: Not on file   Intimate Partner Violence:     Fear of Current or Ex-Partner: Not on file    Emotionally Abused: Not on file    Physically Abused: Not on file    Sexually Abused: Not on file   Housing Stability:     Unable to Pay for Housing in the Last Year: Not on file    Number of [place] in the Last Year: Not on file    Unstable Housing in the Last Year: Not on file     Current Facility-Administered Medications   Medication Dose Route Frequency Provider Last Rate Last Admin    benzocaine-Menthol (CEPACOL INSTAMAX) lozenge 1 lozenge  1 lozenge Oral Once [employer], PA-C        azithromycin [facility]) tablet 500 mg  500 mg Oral Once [employer], IMANI         Current Outpatient Medications   Medication Sig Dispense Refill    Pseudoephedrine-DM-GG (CAPMIST DM) 60- MG TABS Take 1 tablet by mouth 3 times daily as needed (congestion) 20 tablet 0    azithromycin (ZITHROMAX Z-DAYA) 250 MG tablet Take 2 tablets (500 mg) on Day 1, and then take 1 tablet (250 mg) on days 2 through 5. 1 packet 0    albuterol sulfate HFA (PROVENTIL HFA) 108 (90 Base) MCG/ACT inhaler Inhale 2 puffs into the lungs every 4 hours as needed for Wheezing or Shortness of Breath With spacer (and mask if indicated). Thanks.  18 g 1    FLUoxetine (PROZAC) 10 MG capsule TAKE 3 CAPSULES BY MOUTH ONCE DAILY      metoprolol succinate (TOPROL XL) 25 MG extended release tablet Take 1 tablet by mouth daily 30 tablet 0    hydrOXYzine (ATARAX) 25 MG tablet Take 1 tablet by mouth 3 times daily as needed for Anxiety 90 tablet 1    etonogestrel (NEXPLANON) 68 MG implant 68 mg by Subdermal route once       Allergies   Allergen Reactions    Celexa [Citalopram] Other (See Comments)     \"manic episodes\"    Topamax [Topiramate]        Nursing Notes Reviewed    Physical Exam:  ED Triage Vitals   Enc Vitals Group      BP 04/03/22 0058 128/87      Pulse 04/03/22 0058 103      Resp 04/03/22 0058 19      Temp 04/03/22 0058 98.1 °F (36.7 °C)      Temp Source 04/03/22 0058 Oral      SpO2 04/03/22 0058 99 %      Weight 04/03/22 0055 220 lb (99.8 kg)      Height 04/03/22 0055 5' 5\" (1.651 m)      Head Circumference --       Peak Flow --       Pain Score --       Pain Loc --       Pain Edu? --       Excl. in [address]? --      GENERAL APPEARANCE: Awake and alert. Cooperative. No acute distress. HEAD: Normocephalic. Atraumatic. EYES: EOM's grossly intact. Sclera anicteric. PERRLA. Conjunctiva non injected. No discharge  ENT: Mucous membranes are moist. No trismus. Posterior Pharynx non injected, no tongue swelling, airway patent, no tonsillar edema. No exudates noted. No abscess. No discharge. Middle ear spaces clear. Tympanic membrane non injected. Auditory canal clear. NECK: Supple. No meningismus. No palpable masses. No lymphadenopathy. CARDIOVASCULAR: RRR. No rubs, murmurs, gallops, clicks. Radial pulses 2+. Pedal Pulses 2+. Capillary refill <2 seconds. LUNGS: Respirations unlabored. CTAB. ABDOMEN: Soft. Non-tender. No guarding or rebound. No organomegaly. No palpable masses  MUSCULOSKELETAL: No acute deformities. SKIN: Warm and dry. No rash, No erythema, No edema. No ecchymoses. NEUROLOGICAL: No gross facial drooping. Moves all 4 extremities spontaneously. PSYCHIATRIC: Normal mood. I have reviewed and interpreted all of the currently available lab results from this visit (if applicable):  No results found for this visit on 04/03/22. Radiographs (if obtained):  [] The following radiograph was interpreted by myself in the absence of a radiologist:   [] Radiologist's Report Reviewed:  No orders to display       EKG (if obtained):   Please See Note of attending physician for EKG interpretation.      Chart review shows recent radiograph(s):  No results found. MDM:   History Signs and symptoms congruent with URI. Doubt meningitis, pneumonia, bronchitis, respiratory distress, asthma exacerbation, retropharengial abscess, ludwigs angina, chronic sinusitis, otitis, streptococcal pharyngitis, conjunctivitis, pulmonary embolism, pneumothorax, other. Pt is to be discharged home. Pt is told to return immediately to the emergency department if he has any new, worrisome or worsening symptoms. Pt is to follow up with PCP within 2 days. Patient vocalizes agreement and understanding with this plan and he has no questions upon disposition. Pt is comfortable upon disposition home. Patient is stable, Patients vital signs are stable. Vital signs and nursing notes reviewed during ED course. I independently saw patient today in the ED. All pertinent Lab data and radiographic results reviewed with patient at bedside. The patient and/or the family were informed of the results of any tests/labs/imaging, the treatment plan, and time was allotted to answer questions. See chart for details of medications given during the ED stay. Clinical Impression:  1. Upper respiratory tract infection, unspecified type      (Please note that portions of this note may have been completed with a voice recognition program. Efforts were made to edit the dictations but occasionally words are mis-transcribed.)    Fatoumata Sorenson PA-C   Comment: Please note this report has been produced using speech recognition software and may contain errors related to that system including errors in grammar, punctuation, and spelling, as well as words and phrases that may be inappropriate. If there are any questions or concerns please feel free to contact the dictating provider for clarification.         Fatoumata Sorenson PA-C  04/03/22 0139

## 2022-04-17 ENCOUNTER — APPOINTMENT (OUTPATIENT)
Dept: CT IMAGING | Age: 22
End: 2022-04-17
Payer: COMMERCIAL

## 2022-04-17 ENCOUNTER — HOSPITAL ENCOUNTER (EMERGENCY)
Age: 22
Discharge: HOME | End: 2022-04-17
Attending: STUDENT IN AN ORGANIZED HEALTH CARE EDUCATION/TRAINING PROGRAM
Payer: COMMERCIAL

## 2022-04-17 VITALS
DIASTOLIC BLOOD PRESSURE: 87 MMHG | TEMPERATURE: 98 F | HEART RATE: 97 BPM | RESPIRATION RATE: 15 BRPM | BODY MASS INDEX: 36.16 KG/M2 | HEIGHT: 66 IN | OXYGEN SATURATION: 100 % | WEIGHT: 225 LBS | SYSTOLIC BLOOD PRESSURE: 130 MMHG

## 2022-04-17 DIAGNOSIS — Z86.69 HISTORY OF SEIZURE DISORDER: Primary | ICD-10-CM

## 2022-04-17 DIAGNOSIS — F41.1 ANXIETY STATE: ICD-10-CM

## 2022-04-17 LAB
ANION GAP SERPL CALCULATED.3IONS-SCNC: 11 MMOL/L (ref 4–16)
BASOPHILS ABSOLUTE: 0 K/CU MM
BASOPHILS RELATIVE PERCENT: 0.1 % (ref 0–1)
BUN BLDV-MCNC: 14 MG/DL (ref 6–23)
CALCIUM SERPL-MCNC: 9.8 MG/DL (ref 8.3–10.6)
CHLORIDE BLD-SCNC: 106 MMOL/L (ref 99–110)
CO2: 24 MMOL/L (ref 21–32)
CREAT SERPL-MCNC: 0.6 MG/DL (ref 0.6–1.1)
DIFFERENTIAL TYPE: ABNORMAL
EOSINOPHILS ABSOLUTE: 0.1 K/CU MM
EOSINOPHILS RELATIVE PERCENT: 1.7 % (ref 0–3)
GFR AFRICAN AMERICAN: >60 ML/MIN/1.73M2
GFR NON-AFRICAN AMERICAN: >60 ML/MIN/1.73M2
GLUCOSE BLD-MCNC: 98 MG/DL (ref 70–99)
HCG QUALITATIVE: NEGATIVE
HCT VFR BLD CALC: 39.4 % (ref 37–47)
HEMOGLOBIN: 12.7 GM/DL (ref 12.5–16)
IMMATURE NEUTROPHIL %: 0.2 % (ref 0–0.43)
LYMPHOCYTES ABSOLUTE: 2 K/CU MM
LYMPHOCYTES RELATIVE PERCENT: 23.6 % (ref 24–44)
MCH RBC QN AUTO: 32 PG (ref 27–31)
MCHC RBC AUTO-ENTMCNC: 32.2 % (ref 32–36)
MCV RBC AUTO: 99.2 FL (ref 78–100)
MONOCYTES ABSOLUTE: 0.5 K/CU MM
MONOCYTES RELATIVE PERCENT: 6.2 % (ref 0–4)
NUCLEATED RBC %: 0 %
PDW BLD-RTO: 12.8 % (ref 11.7–14.9)
PLATELET # BLD: 383 K/CU MM (ref 140–440)
PMV BLD AUTO: 9 FL (ref 7.5–11.1)
POTASSIUM SERPL-SCNC: 3.8 MMOL/L (ref 3.5–5.1)
RBC # BLD: 3.97 M/CU MM (ref 4.2–5.4)
SEGMENTED NEUTROPHILS ABSOLUTE COUNT: 5.7 K/CU MM
SEGMENTED NEUTROPHILS RELATIVE PERCENT: 68.2 % (ref 36–66)
SODIUM BLD-SCNC: 141 MMOL/L (ref 135–145)
TOTAL IMMATURE NEUTOROPHIL: 0.02 K/CU MM
TOTAL NUCLEATED RBC: 0 K/CU MM
WBC # BLD: 8.4 K/CU MM (ref 4–10.5)

## 2022-04-17 PROCEDURE — 70450 CT HEAD/BRAIN W/O DYE: CPT

## 2022-04-17 PROCEDURE — 99285 EMERGENCY DEPT VISIT HI MDM: CPT

## 2022-04-17 PROCEDURE — 6360000002 HC RX W HCPCS: Performed by: STUDENT IN AN ORGANIZED HEALTH CARE EDUCATION/TRAINING PROGRAM

## 2022-04-17 PROCEDURE — 80048 BASIC METABOLIC PNL TOTAL CA: CPT

## 2022-04-17 PROCEDURE — 96374 THER/PROPH/DIAG INJ IV PUSH: CPT

## 2022-04-17 PROCEDURE — 93005 ELECTROCARDIOGRAM TRACING: CPT | Performed by: STUDENT IN AN ORGANIZED HEALTH CARE EDUCATION/TRAINING PROGRAM

## 2022-04-17 PROCEDURE — 85025 COMPLETE CBC W/AUTO DIFF WBC: CPT

## 2022-04-17 PROCEDURE — 84703 CHORIONIC GONADOTROPIN ASSAY: CPT

## 2022-04-17 PROCEDURE — 96375 TX/PRO/DX INJ NEW DRUG ADDON: CPT

## 2022-04-17 RX ORDER — DIPHENHYDRAMINE HYDROCHLORIDE 50 MG/ML
25 INJECTION INTRAMUSCULAR; INTRAVENOUS ONCE
Status: COMPLETED | OUTPATIENT
Start: 2022-04-17 | End: 2022-04-17

## 2022-04-17 RX ORDER — PROCHLORPERAZINE EDISYLATE 5 MG/ML
10 INJECTION INTRAMUSCULAR; INTRAVENOUS ONCE
Status: COMPLETED | OUTPATIENT
Start: 2022-04-17 | End: 2022-04-17

## 2022-04-17 RX ORDER — LORAZEPAM 2 MG/ML
2 INJECTION INTRAMUSCULAR ONCE
Status: COMPLETED | OUTPATIENT
Start: 2022-04-17 | End: 2022-04-17

## 2022-04-17 RX ADMIN — LORAZEPAM 2 MG: 2 INJECTION INTRAMUSCULAR; INTRAVENOUS at 15:22

## 2022-04-17 RX ADMIN — DIPHENHYDRAMINE HYDROCHLORIDE 25 MG: 50 INJECTION, SOLUTION INTRAMUSCULAR; INTRAVENOUS at 13:56

## 2022-04-17 RX ADMIN — PROCHLORPERAZINE EDISYLATE 10 MG: 5 INJECTION INTRAMUSCULAR; INTRAVENOUS at 13:56

## 2022-04-17 ASSESSMENT — PAIN DESCRIPTION - FREQUENCY: FREQUENCY: CONTINUOUS

## 2022-04-17 ASSESSMENT — ENCOUNTER SYMPTOMS
EYE REDNESS: 0
EYE PAIN: 0
SORE THROAT: 0
VOMITING: 0
COUGH: 0
NAUSEA: 0
DIARRHEA: 0
SHORTNESS OF BREATH: 0
ABDOMINAL PAIN: 0

## 2022-04-17 ASSESSMENT — PAIN DESCRIPTION - LOCATION: LOCATION: HEAD

## 2022-04-17 ASSESSMENT — PAIN SCALES - GENERAL: PAINLEVEL_OUTOF10: 7

## 2022-04-17 ASSESSMENT — PAIN DESCRIPTION - DESCRIPTORS: DESCRIPTORS: ACHING

## 2022-04-17 NOTE — ED PROVIDER NOTES
[facility] ED Attending Note:    NAME: Colby Grossman 19 y.o.  CSN: [id number]  MRN: [id number]   PCP:    José Miguel Casanova      History:     Chief Complaint:   Seizures     HPI:      The history was obtained from the patient. Marcy Vázquez is a 19 y.o. female who presents after a seizure at work. Per patient she has a history of pseudoseizures that are triggered due to depression and anxiety. Reports that she was at work today when she had another one. Patient reports that her last seizure was a month ago. States that she is not in any anticonvulsant medication. Reports she takes anxiety medication to prevent the seizures per his PCP and have been working well. Patient reports that she has been compliant with her medications. Patient reports that she was a little more postictal than usual today. Stated he took her longer than usual.  She is usually awake and alert right after the seizures and this time it took a few minutes. No rescue medications were giving to stop the seizures. Patient denies any trauma after the seizure. Reports she is usually able to find a comfortable place or the floor before her seizures occur. PMHx:    Past Medical History:   Diagnosis Date    Anxiety     anxiety induced seizures    Depression     Pseudoseizure        PMSx:  No past surgical history on file. FAM. Hx: No family history on file. SOC. Hx:   Social History     Socioeconomic History    Marital status: Single     Spouse name: Not on file    Number of children: Not on file    Years of education: Not on file    Highest education level: Not on file   Occupational History    Not on file   Tobacco Use    Smoking status: Current Every Day Smoker     Packs/day: 0.50     Types: Cigarettes     Start date: 1/7/2016    Smokeless tobacco: Never Used   Vaping Use    Vaping Use: Never used   Substance and Sexual Activity    Alcohol use: No    Drug use:  Yes Types: Marijuana [employer]urlie Opal)    Sexual activity: Not on file   Other Topics Concern    Not on file   Social History Narrative    Not on file     Social Determinants of Health     Financial Resource Strain:     Difficulty of Paying Living Expenses: Not on file   Food Insecurity:     Worried About Running Out of Food in the Last Year: Not on file    [place] of Food in the Last Year: Not on file   Transportation Needs:     Lack of Transportation (Medical): Not on file    Lack of Transportation (Non-Medical): Not on file   Physical Activity:     Days of Exercise per Week: Not on file    Minutes of Exercise per Session: Not on file   Stress:     Feeling of Stress : Not on file   Social Connections:     Frequency of Communication with Friends and Family: Not on file    Frequency of Social Gatherings with Friends and Family: Not on file    Attends Sikh Services: Not on file    Active Member of [address] [place] [address] [employer] or Organizations: Not on file    Attends Club or Organization Meetings: Not on file    Marital Status: Not on file   Intimate Partner Violence:     Fear of Current or Ex-Partner: Not on file    Emotionally Abused: Not on file    Physically Abused: Not on file    Sexually Abused: Not on file   Housing Stability:     Unable to Pay for Housing in the Last Year: Not on file    Number of [place] in the Last Year: Not on file    Unstable Housing in the Last Year: Not on file       MEDs:    Previous Medications    ALBUTEROL SULFATE HFA (PROVENTIL HFA) 108 (90 BASE) MCG/ACT INHALER    Inhale 2 puffs into the lungs every 4 hours as needed for Wheezing or Shortness of Breath With spacer (and mask if indicated). Thanks.     ETONOGESTREL (NEXPLANON) 68 MG IMPLANT    68 mg by Subdermal route once    FLUOXETINE (PROZAC) 10 MG CAPSULE    TAKE 3 CAPSULES BY MOUTH ONCE DAILY    HYDROXYZINE (ATARAX) 25 MG TABLET    Take 1 tablet by mouth 3 times daily as needed for Anxiety    METOPROLOL SUCCINATE (TOPROL XL) 25 MG EXTENDED RELEASE TABLET    Take 1 tablet by mouth daily    PSEUDOEPHEDRINE-DM-GG (CAPMIST DM) 60- MG TABS    Take 1 tablet by mouth 3 times daily as needed (congestion)        ALL:     Allergies   Allergen Reactions    Celexa [Citalopram] Other (See Comments)     \"manic episodes\"    Topamax [Topiramate]        ROS:  Review of Systems   Constitutional: Negative for fatigue and fever. HENT: Negative for congestion and sore throat. Eyes: Negative for pain and redness. Respiratory: Negative for cough and shortness of breath. Cardiovascular: Negative for chest pain and leg swelling. Gastrointestinal: Negative for abdominal pain, diarrhea, nausea and vomiting. Genitourinary: Negative for difficulty urinating. Skin: Negative for rash. Neurological: Negative for tremors, facial asymmetry, weakness and headaches. Pseudoseizures   Psychiatric/Behavioral: Negative for agitation and behavioral problems. Positives andpertinent negatives as per HPI. All other systems were reviewed and are negative. Physical Exam:      Patient Vitals for the past 24 hrs:   BP Temp Temp src Pulse Resp SpO2 Height Weight   04/17/22 1152 119/74 98 °F (36.7 °C) Oral 79 17 100 % 5' 6\" (1.676 m) 225 lb (102.1 kg)                  Physical Exam  Constitutional:       Appearance: Normal appearance. She is well-developed. HENT:      Head: Normocephalic. Eyes:      General: No visual field deficit. Extraocular Movements: Extraocular movements intact. Pupils: Pupils are equal, round, and reactive to light. Cardiovascular:      Rate and Rhythm: Normal rate and regular rhythm. Pulmonary:      Effort: Pulmonary effort is normal. No respiratory distress. Breath sounds: Normal breath sounds. Abdominal:      Palpations: Abdomen is soft. Tenderness: There is no abdominal tenderness. There is no guarding or rebound. Musculoskeletal:         General: Normal range of motion.       Cervical back: Normal range of motion and neck supple. Skin:     General: Skin is warm and dry. Capillary Refill: Capillary refill takes less than 2 seconds. Neurological:      General: No focal deficit present. Mental Status: She is alert and oriented to person, place, and time. Cranial Nerves: No cranial nerve deficit, dysarthria or facial asymmetry. Sensory: No sensory deficit. Motor: No weakness, abnormal muscle tone, seizure activity or pronator drift. Coordination: Coordination normal. Finger-Nose-Finger Test normal.      Gait: Gait is intact. Psychiatric:         Mood and Affect: Mood normal.         Behavior: Behavior normal.              Laboratory & Radiological Imaging (if done):      Labs Reviewed   CBC WITH AUTO DIFFERENTIAL - Abnormal; Notable for the following components:       Result Value    RBC 3.97 (*)     MCH 32.0 (*)     Segs Relative 68.2 (*)     Lymphocytes % 23.6 (*)     Monocytes % 6.2 (*)     All other components within normal limits   HCG, SERUM, QUALITATIVE   BASIC METABOLIC PANEL W/ REFLEX TO MG FOR LOW K        Interpretation per the Radiologist below, if available at the time of this note:  CT Head  IMPRESSION:  No acute intracranial abnormality evident by CT.       Medical Decision Making/           Patient is a 19year old female who presents to the ED after having seizure like activity at work. Patient reports history of pseudoseizures and states she takes anxiety medication to control them as they are anxiety induced. Patient is at baseline, no signs of trauma and with normal vital signs. 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 Denied having any vision changes or headaches in the morning. Discussed with patient will do a CT head in the ED and refer her with neurology for MRI follow up. Patient agrees with plan of care.  Compazine and Benadryl given for headache. Patient also reported anxiety, Ativan given. Patient continued to be monitored in the ED. No other episodes while in the ED and CT and laboratory were WNL. Patient was given at home precautions and proper follow up       Procedure(s):     12 lead EKG per my interpretation:  Normal Sinus Rhythm 65  Axis is   Normal  QTc is  normal  There is no specific T wave changes appreciated. There is no specific ST wave changes appreciated. No STEMI   Prior EKG to compare with was not available         Clinical Impression:      1. History of seizure disorder    2. Anxiety state          Disposition:              Patient is being discharged        Discharge Medication List as of 4/17/2022  5:16 PM                                                                      Alyssa Grewal M.D                                                              ED Attending Physician      (Please note that portions of this note have been completed with a voice recognition software.  Efforts were made to correct any errors, butoccasionally words are mis-transcribed.)              Alyssa Grewal MD  04/18/22 [id number]

## 2022-04-17 NOTE — ED NOTES
Reviewed discharge information. Patient verbalized understanding of paperwork, medication, and care instructions. Patient denied any questions.      Javier Reeder RN  04/17/22 0664

## 2022-04-19 LAB
EKG ATRIAL RATE: 65 BPM
EKG DIAGNOSIS: NORMAL
EKG P AXIS: 27 DEGREES
EKG P-R INTERVAL: 150 MS
EKG Q-T INTERVAL: 408 MS
EKG QRS DURATION: 78 MS
EKG QTC CALCULATION (BAZETT): 424 MS
EKG R AXIS: 34 DEGREES
EKG T AXIS: 22 DEGREES
EKG VENTRICULAR RATE: 65 BPM

## 2022-04-19 PROCEDURE — 93010 ELECTROCARDIOGRAM REPORT: CPT | Performed by: INTERNAL MEDICINE
